# Patient Record
(demographics unavailable — no encounter records)

---

## 2024-11-07 NOTE — HISTORY OF PRESENT ILLNESS
[Irregular Cycle Intervals] : are  irregular [Y] : Positive pregnancy history [Menarche Age: ____] : age at menarche was [unfilled] [Currently Active] : currently active [Men] : men [No] : No [TextBox_4] : 30-year-old para 2-0-1-2 with LMP 9/24/2024 came for GYN exam due to most recent Pap smear showing ASCUS HPV positive with another provider.  Patient also states she missed her last menses and has been having pelvic pain for the last couple days.  She denies discharge or dysuria. [PapSmeardate] : 4/2024 [LMPDate] : 9/24/24 [PGHxTotal] : 3 [[facility] 2 [PGHxAbortions] : 1 [[facility]] : 2 [PGHxABInduced] : 1 [FreeTextEntry1] : 9/24/24

## 2024-11-07 NOTE — HISTORY OF PRESENT ILLNESS
[Irregular Cycle Intervals] : are  irregular [Y] : Positive pregnancy history [Menarche Age: ____] : age at menarche was [unfilled] [Currently Active] : currently active [Men] : men [No] : No [TextBox_4] : 30-year-old para 2-0-1-2 with LMP 9/24/2024 came for GYN exam due to most recent Pap smear showing ASCUS HPV positive with another provider.  Patient also states she missed her last menses and has been having pelvic pain for the last couple days.  She denies discharge or dysuria. [PapSmeardate] : 4/2024 [LMPDate] : 9/24/24 [PGHxTotal] : 3 [[facility] 2 [PGHxAbortions] : 1 [[facility] 2 [PGHxABInduced] : 1 [FreeTextEntry1] : 9/24/24

## 2024-11-07 NOTE — HISTORY OF PRESENT ILLNESS
[Irregular Cycle Intervals] : are  irregular [Y] : Positive pregnancy history [Menarche Age: ____] : age at menarche was [unfilled] [Currently Active] : currently active [Men] : men [No] : No [TextBox_4] : 30-year-old para 2-0-1-2 with LMP 9/24/2024 came for GYN exam due to most recent Pap smear showing ASCUS HPV positive with another provider.  Patient also states she missed her last menses and has been having pelvic pain for the last couple days.  She denies discharge or dysuria. [PapSmeardate] : 4/2024 [LMPDate] : 9/24/24 [PGHxTotal] : 3 [[facility] 2 [PGHxAbortions] : 1 [[facility]] : 2 [PGHxABInduced] : 1 [FreeTextEntry1] : 9/24/24